# Patient Record
Sex: FEMALE | Race: WHITE | URBAN - METROPOLITAN AREA
[De-identification: names, ages, dates, MRNs, and addresses within clinical notes are randomized per-mention and may not be internally consistent; named-entity substitution may affect disease eponyms.]

---

## 2021-02-23 ENCOUNTER — OFFICE VISIT (OUTPATIENT)
Dept: ORTHOPEDIC SURGERY | Age: 46
End: 2021-02-23
Payer: COMMERCIAL

## 2021-02-23 VITALS — WEIGHT: 185 LBS | HEIGHT: 67 IN | BODY MASS INDEX: 29.03 KG/M2

## 2021-02-23 DIAGNOSIS — M25.552 ACUTE PAIN OF BOTH HIPS: Primary | ICD-10-CM

## 2021-02-23 DIAGNOSIS — M70.61 TROCHANTERIC BURSITIS OF BOTH HIPS: ICD-10-CM

## 2021-02-23 DIAGNOSIS — M25.551 ACUTE PAIN OF BOTH HIPS: Primary | ICD-10-CM

## 2021-02-23 DIAGNOSIS — M70.62 TROCHANTERIC BURSITIS OF BOTH HIPS: ICD-10-CM

## 2021-02-23 PROCEDURE — 99202 OFFICE O/P NEW SF 15 MIN: CPT | Performed by: ORTHOPAEDIC SURGERY

## 2021-02-23 NOTE — PROGRESS NOTES
Chief Complaint   Patient presents with    New Patient     Bilateral hip pain     This 69-year-old patient is seen here complaining of pain in both the hips. The left is worse than the right. The history is that she has had left hip pain for a number of years now about 5. She was diagnosed as having trochanteric bursitis and had a corticosteroid injection. She found that injection extremely painful and therefore did not follow-up. She is not interested in any further injections. For the last couple of months she has been having pain in the right hip pain exactly the same spot. She is unable to sleep on either side now. When she sleeps in supine position but gets a funny feeling in the legs more like stiffness than paresthesia. The pain does radiate sometimes down to the knee level. But there is no further radiation of below the knee. Patient tells me that when she was a child of the baby she had braces on both the legs because she was walking pigeon toes. She says she was also told that one of the legs but will shorter than the other. Examination: Her gait and stance are completely normal.    Checking the level of iliac crest that appeared to be well balanced. In supine position I could not detect any leg length discrepancy. She has excellent range of motion bilaterally. Given his motions are painless. And that she has a lot more internal rotation than external rotations. On the right side she rotates almost about 60 degrees and externally rotates about 15 to 20 degrees. Similarly she rotates more internally than externally on the left side. When she sits up one can rotate her right knee internally almost 80 degrees. She was extremely tender to palpate over the greater trochanter. X-rays: Patient has had standing AP of the pelvis to show both the hips and supine AP and lateral of both the hips. The hips are well contained. There is a good deal suggestion that the patient has protrusio left side. Diagnosis: #1 bilateral trochanteric bursitis. #2 protrusio acetabulae right side. Treatment: Because she does not want to have any corticosteroid injections I have arranging for her to have physical therapy including iontophoresis and will see her again in 3 weeks time. If by that time she is not improving then might suggest another attempt at trochanteric bursa injection. This would have to be done very slowly.

## 2021-02-26 ENCOUNTER — TELEPHONE (OUTPATIENT)
Dept: ORTHOPEDIC SURGERY | Age: 46
End: 2021-02-26

## 2021-02-26 ENCOUNTER — HOSPITAL ENCOUNTER (OUTPATIENT)
Dept: PHYSICAL THERAPY | Age: 46
Setting detail: THERAPIES SERIES
Discharge: HOME | End: 2021-02-26
Payer: COMMERCIAL

## 2021-02-26 NOTE — TELEPHONE ENCOUNTER
Pt called today and left a vm stating that she is unable to afford physical therapy that was ordered at her appt on 2/23/2021    Per note from 2/23/21: Diagnosis: #1 bilateral trochanteric bursitis. #2 protrusio acetabulae right side.     Treatment: Because she does not want to have any corticosteroid injections I have arranging for her to have physical therapy including iontophoresis and will see her again in 3 weeks time.     If by that time she is not improving then might suggest another attempt at trochanteric bursa injection. This would have to be done very slowly. Spoke to pt and she stated that PT will be 360.00 per session because pt has not met her deductible yet. Pt had a cortocosteroid shot once before and did not do well with it due to anxiety. She said if she was able to have a valium before the injection and arrange for her  to bring her to the appt so he could drive her home, she would consider another injection. She is asking if there are any other alternatives to PT or injection. Pt was told that we would call her back on Monday with a response.

## 2021-02-27 NOTE — TELEPHONE ENCOUNTER
If she would like to try another injection I will be happy to prescribe Valium for her before she comes to the office.

## 2021-03-01 DIAGNOSIS — M77.9 TENDINITIS: Primary | ICD-10-CM

## 2021-03-01 DIAGNOSIS — F41.9 ANXIETY: ICD-10-CM

## 2021-03-01 RX ORDER — DIAZEPAM 5 MG/1
5 TABLET ORAL EVERY 8 HOURS PRN
Qty: 10 TABLET | Refills: 1 | Status: SHIPPED | OUTPATIENT
Start: 2021-03-01 | End: 2021-03-11

## 2021-03-01 NOTE — TELEPHONE ENCOUNTER
We can do both at the same time. If she has some ice packs she should bring them with her.  Or bting airtight zip lock bags or frozen vegetable packets

## 2021-03-01 NOTE — TELEPHONE ENCOUNTER
Spoke to pt. She would like both on the same day and will bring ice packs. Please send Rx for Valium to her pharmacy.

## 2021-03-09 ENCOUNTER — OFFICE VISIT (OUTPATIENT)
Dept: ORTHOPEDIC SURGERY | Age: 46
End: 2021-03-09
Payer: COMMERCIAL

## 2021-03-09 VITALS — BODY MASS INDEX: 29.03 KG/M2 | HEIGHT: 67 IN | WEIGHT: 185 LBS

## 2021-03-09 DIAGNOSIS — M70.62 TROCHANTERIC BURSITIS OF BOTH HIPS: Primary | ICD-10-CM

## 2021-03-09 DIAGNOSIS — M70.61 TROCHANTERIC BURSITIS OF BOTH HIPS: Primary | ICD-10-CM

## 2021-03-09 PROCEDURE — 99211 OFF/OP EST MAY X REQ PHY/QHP: CPT | Performed by: ORTHOPAEDIC SURGERY

## 2021-03-09 PROCEDURE — 20610 DRAIN/INJ JOINT/BURSA W/O US: CPT | Performed by: ORTHOPAEDIC SURGERY

## 2021-03-09 RX ORDER — METHYLPREDNISOLONE ACETATE 40 MG/ML
40 INJECTION, SUSPENSION INTRA-ARTICULAR; INTRALESIONAL; INTRAMUSCULAR; SOFT TISSUE ONCE
Status: SHIPPED | OUTPATIENT
Start: 2021-03-09

## 2021-03-09 RX ORDER — LIDOCAINE HYDROCHLORIDE 10 MG/ML
10 INJECTION, SOLUTION INFILTRATION; PERINEURAL ONCE
Status: SHIPPED | OUTPATIENT
Start: 2021-03-09

## 2021-03-09 NOTE — PROGRESS NOTES
Chief Complaint   Patient presents with    Follow-up     Left hip     This patient with bilateral trochanteric bursitis has agreed to have a corticosteroid injection. The patient went for physical therapy but was told that each visit will cost her $350. She has not met her deductible. Patient says the pain was so bad yesterday that she did not sleep at all. Examination: She has point tenderness over the greater trochanteric area. Diagnosis: Left trochanteric bursitis. Treatment: Under sterile condition I injected 40 mg Depo-Medrol and about 10 cc of 1% plain lidocaine. This was injected over a prolonged period of time to minimize the pain for her. She tolerated procedure well. I have instructed to put a lot of ice on it area. I will see her again in a week's time at that time if she agrees then we will inject the right side if the left side is working for her.

## 2021-03-16 ENCOUNTER — OFFICE VISIT (OUTPATIENT)
Dept: ORTHOPEDIC SURGERY | Age: 46
End: 2021-03-16
Payer: COMMERCIAL

## 2021-03-16 VITALS — WEIGHT: 185 LBS | BODY MASS INDEX: 29.03 KG/M2 | HEIGHT: 67 IN

## 2021-03-16 DIAGNOSIS — M70.62 TROCHANTERIC BURSITIS OF BOTH HIPS: Primary | ICD-10-CM

## 2021-03-16 DIAGNOSIS — M70.61 TROCHANTERIC BURSITIS OF BOTH HIPS: Primary | ICD-10-CM

## 2021-03-16 PROCEDURE — 99212 OFFICE O/P EST SF 10 MIN: CPT | Performed by: ORTHOPAEDIC SURGERY

## 2021-03-16 PROCEDURE — 20610 DRAIN/INJ JOINT/BURSA W/O US: CPT | Performed by: ORTHOPAEDIC SURGERY

## 2021-03-16 RX ORDER — METHYLPREDNISOLONE ACETATE 40 MG/ML
40 INJECTION, SUSPENSION INTRA-ARTICULAR; INTRALESIONAL; INTRAMUSCULAR; SOFT TISSUE ONCE
Status: SHIPPED | OUTPATIENT
Start: 2021-03-16

## 2021-03-16 RX ORDER — LIDOCAINE HYDROCHLORIDE 10 MG/ML
12 INJECTION, SOLUTION INFILTRATION; PERINEURAL ONCE
Status: SHIPPED | OUTPATIENT
Start: 2021-03-16

## 2021-03-16 NOTE — PROGRESS NOTES
Chief Complaint   Patient presents with    Follow-up     hip injection - right      This patient is seen here today because of trochanteric pain on the right hip. Week ago she had a trochanteric injection on the left side and she says the left hip is definitely feeling better than before. She has not tried lying on that side as yet. Examination: She is tender over the greater trochanter and she pinpointed the trigger point. Under sterile condition I injected 40 mg Depo-Medrol and 7 cc of 1% plain lidocaine into the trochanteric area. She felt better after injection. I have already explained to her how the injection works and should she have any problem after 3 weeks then I will be happy to see her again.

## 2021-05-11 ENCOUNTER — OFFICE VISIT (OUTPATIENT)
Dept: ORTHOPEDIC SURGERY | Age: 46
End: 2021-05-11
Payer: COMMERCIAL

## 2021-05-11 VITALS — WEIGHT: 185 LBS | HEIGHT: 67 IN | BODY MASS INDEX: 29.03 KG/M2

## 2021-05-11 DIAGNOSIS — M70.62 TROCHANTERIC BURSITIS OF BOTH HIPS: Primary | ICD-10-CM

## 2021-05-11 DIAGNOSIS — M70.61 TROCHANTERIC BURSITIS OF BOTH HIPS: Primary | ICD-10-CM

## 2021-05-11 PROCEDURE — 99212 OFFICE O/P EST SF 10 MIN: CPT | Performed by: ORTHOPAEDIC SURGERY

## 2021-05-11 PROCEDURE — 20610 DRAIN/INJ JOINT/BURSA W/O US: CPT | Performed by: ORTHOPAEDIC SURGERY

## 2021-05-11 RX ORDER — METHYLPREDNISOLONE ACETATE 40 MG/ML
40 INJECTION, SUSPENSION INTRA-ARTICULAR; INTRALESIONAL; INTRAMUSCULAR; SOFT TISSUE ONCE
Status: SHIPPED | OUTPATIENT
Start: 2021-05-11

## 2021-05-11 RX ORDER — LIDOCAINE HYDROCHLORIDE 10 MG/ML
10 INJECTION, SOLUTION INFILTRATION; PERINEURAL ONCE
Status: SHIPPED | OUTPATIENT
Start: 2021-05-11

## 2021-05-11 NOTE — PROGRESS NOTES
Chief Complaint   Patient presents with    Follow-up     Left hip pain   This patient who has previously had corticosteroid injections for trochanteric bursitis is seen here because of recurrence of pain in the left trochanteric area. Patient says that the left hip is hurting so she cannot sleep on that side and that started to sleep on the right side but the right side is starting to hurt. So she sleeps on her back and now her tailbone is hurting. Patient has been petrified of injections in the past.  We did inject 2 times alternatively in the trochanteric area and had a good response. Examination: She definitely has point tenderness over the greater trochanter on the left side. Her range of motion in the left hip are excellent. Diagnosis: Left trochanteric bursitis left hip. Possible left hip protrusio. Treatment: Under sterile condition I injected 8 cc of 1% plain lidocaine and 80 mg of Depo-Medrol into the tender area. I have advised her to use a lot of ice when she gets home and I will see her as needed. Is a start help with her may arrange to have a intra-articular injection of the left hip because she does appear to have protrusio.